# Patient Record
Sex: FEMALE | Race: WHITE | NOT HISPANIC OR LATINO
[De-identification: names, ages, dates, MRNs, and addresses within clinical notes are randomized per-mention and may not be internally consistent; named-entity substitution may affect disease eponyms.]

---

## 2019-03-06 PROBLEM — Z00.00 ENCOUNTER FOR PREVENTIVE HEALTH EXAMINATION: Status: ACTIVE | Noted: 2019-03-06

## 2019-03-15 ENCOUNTER — APPOINTMENT (OUTPATIENT)
Dept: BARIATRICS | Facility: CLINIC | Age: 49
End: 2019-03-15
Payer: COMMERCIAL

## 2019-03-15 VITALS
BODY MASS INDEX: 42.33 KG/M2 | HEIGHT: 62 IN | WEIGHT: 230 LBS | SYSTOLIC BLOOD PRESSURE: 118 MMHG | DIASTOLIC BLOOD PRESSURE: 73 MMHG | HEART RATE: 74 BPM

## 2019-03-15 DIAGNOSIS — Z87.891 PERSONAL HISTORY OF NICOTINE DEPENDENCE: ICD-10-CM

## 2019-03-15 DIAGNOSIS — Z80.9 FAMILY HISTORY OF MALIGNANT NEOPLASM, UNSPECIFIED: ICD-10-CM

## 2019-03-15 DIAGNOSIS — F32.9 MAJOR DEPRESSIVE DISORDER, SINGLE EPISODE, UNSPECIFIED: ICD-10-CM

## 2019-03-15 DIAGNOSIS — Z87.09 PERSONAL HISTORY OF OTHER DISEASES OF THE RESPIRATORY SYSTEM: ICD-10-CM

## 2019-03-15 PROCEDURE — 99202 OFFICE O/P NEW SF 15 MIN: CPT

## 2019-03-15 NOTE — HISTORY OF PRESENT ILLNESS
[de-identified] : 48 year old female with refractive morbid obesity who is followed by Dr. Ramires. Has been on countless diets without success. 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 Additionally, shee has HLD on her blood work referred by Dr. Ramires for bariatric surgical consult and as explained to pt it is not likely any other treatment will be successful but also not at risk for severe complication from metabolic disease at present. Pt wants to go forward with VSG and think this is reasonable and nothing further to be gained by insurance mandated diet in motivated pt that has been followed by her physician.

## 2019-03-22 ENCOUNTER — APPOINTMENT (OUTPATIENT)
Dept: BARIATRICS | Facility: CLINIC | Age: 49
End: 2019-03-22
Payer: COMMERCIAL

## 2019-03-22 PROCEDURE — 90791 PSYCH DIAGNOSTIC EVALUATION: CPT

## 2019-03-27 ENCOUNTER — APPOINTMENT (OUTPATIENT)
Dept: BARIATRICS | Facility: CLINIC | Age: 49
End: 2019-03-27
Payer: COMMERCIAL

## 2019-03-27 PROCEDURE — 97802 MEDICAL NUTRITION INDIV IN: CPT

## 2019-04-15 ENCOUNTER — APPOINTMENT (OUTPATIENT)
Dept: BARIATRICS | Facility: CLINIC | Age: 49
End: 2019-04-15
Payer: COMMERCIAL

## 2019-04-15 VITALS
WEIGHT: 227.2 LBS | BODY MASS INDEX: 41.81 KG/M2 | SYSTOLIC BLOOD PRESSURE: 127 MMHG | HEIGHT: 62 IN | HEART RATE: 66 BPM | DIASTOLIC BLOOD PRESSURE: 82 MMHG

## 2019-04-15 PROCEDURE — 99213 OFFICE O/P EST LOW 20 MIN: CPT

## 2019-04-25 ENCOUNTER — APPOINTMENT (OUTPATIENT)
Dept: BARIATRICS | Facility: HOSPITAL | Age: 49
End: 2019-04-25

## 2019-04-29 ENCOUNTER — CLINICAL ADVICE (OUTPATIENT)
Age: 49
End: 2019-04-29

## 2019-05-06 ENCOUNTER — APPOINTMENT (OUTPATIENT)
Dept: BARIATRICS | Facility: CLINIC | Age: 49
End: 2019-05-06
Payer: COMMERCIAL

## 2019-05-06 VITALS
SYSTOLIC BLOOD PRESSURE: 115 MMHG | BODY MASS INDEX: 39.4 KG/M2 | HEART RATE: 71 BPM | DIASTOLIC BLOOD PRESSURE: 71 MMHG | WEIGHT: 215.4 LBS

## 2019-05-06 PROCEDURE — 97803 MED NUTRITION INDIV SUBSEQ: CPT

## 2019-05-06 PROCEDURE — 99024 POSTOP FOLLOW-UP VISIT: CPT

## 2019-06-27 ENCOUNTER — APPOINTMENT (OUTPATIENT)
Dept: BARIATRICS | Facility: CLINIC | Age: 49
End: 2019-06-27

## 2019-06-27 ENCOUNTER — APPOINTMENT (OUTPATIENT)
Dept: BARIATRICS | Facility: CLINIC | Age: 49
End: 2019-06-27
Payer: COMMERCIAL

## 2019-06-27 VITALS
SYSTOLIC BLOOD PRESSURE: 114 MMHG | DIASTOLIC BLOOD PRESSURE: 76 MMHG | HEART RATE: 76 BPM | BODY MASS INDEX: 35.15 KG/M2 | WEIGHT: 192.2 LBS

## 2019-06-27 PROCEDURE — 99024 POSTOP FOLLOW-UP VISIT: CPT

## 2019-09-19 ENCOUNTER — APPOINTMENT (OUTPATIENT)
Dept: BARIATRICS | Facility: CLINIC | Age: 49
End: 2019-09-19
Payer: COMMERCIAL

## 2019-09-19 ENCOUNTER — APPOINTMENT (OUTPATIENT)
Dept: BARIATRICS | Facility: CLINIC | Age: 49
End: 2019-09-19

## 2019-09-19 VITALS
SYSTOLIC BLOOD PRESSURE: 118 MMHG | DIASTOLIC BLOOD PRESSURE: 77 MMHG | WEIGHT: 167 LBS | HEART RATE: 67 BPM | BODY MASS INDEX: 30.73 KG/M2 | HEIGHT: 62 IN

## 2019-09-19 PROCEDURE — 99213 OFFICE O/P EST LOW 20 MIN: CPT

## 2019-09-19 PROCEDURE — 97803 MED NUTRITION INDIV SUBSEQ: CPT

## 2019-12-19 ENCOUNTER — APPOINTMENT (OUTPATIENT)
Dept: BARIATRICS | Facility: CLINIC | Age: 49
End: 2019-12-19
Payer: COMMERCIAL

## 2019-12-19 VITALS
WEIGHT: 157 LBS | SYSTOLIC BLOOD PRESSURE: 136 MMHG | HEIGHT: 62 IN | BODY MASS INDEX: 28.89 KG/M2 | HEART RATE: 69 BPM | DIASTOLIC BLOOD PRESSURE: 89 MMHG

## 2019-12-19 PROCEDURE — 99213 OFFICE O/P EST LOW 20 MIN: CPT

## 2020-04-30 ENCOUNTER — APPOINTMENT (OUTPATIENT)
Dept: BARIATRICS | Facility: CLINIC | Age: 50
End: 2020-04-30
Payer: COMMERCIAL

## 2020-04-30 VITALS — BODY MASS INDEX: 27.25 KG/M2 | WEIGHT: 149 LBS

## 2020-04-30 PROCEDURE — 99213 OFFICE O/P EST LOW 20 MIN: CPT | Mod: 95

## 2020-05-14 ENCOUNTER — TRANSCRIPTION ENCOUNTER (OUTPATIENT)
Age: 50
End: 2020-05-14

## 2020-09-15 DIAGNOSIS — E46 UNSPECIFIED PROTEIN-CALORIE MALNUTRITION: ICD-10-CM

## 2020-09-25 ENCOUNTER — APPOINTMENT (OUTPATIENT)
Dept: BARIATRICS | Facility: CLINIC | Age: 50
End: 2020-09-25
Payer: COMMERCIAL

## 2020-09-25 PROCEDURE — 97803 MED NUTRITION INDIV SUBSEQ: CPT | Mod: 95

## 2020-10-08 ENCOUNTER — APPOINTMENT (OUTPATIENT)
Dept: BARIATRICS | Facility: CLINIC | Age: 50
End: 2020-10-08
Payer: COMMERCIAL

## 2020-10-08 VITALS — WEIGHT: 152 LBS | BODY MASS INDEX: 27.97 KG/M2 | HEIGHT: 62 IN

## 2020-10-08 DIAGNOSIS — E66.01 MORBID (SEVERE) OBESITY DUE TO EXCESS CALORIES: ICD-10-CM

## 2020-10-08 PROCEDURE — 99213 OFFICE O/P EST LOW 20 MIN: CPT | Mod: 95

## 2020-10-08 NOTE — ASSESSMENT
[FreeTextEntry1] : Hortensia is doing well 1.5 years s/p surgery. Importance of structure stressed in order to maintain weight loss. All questions asked and answered. No complaints.

## 2020-10-08 NOTE — HISTORY OF PRESENT ILLNESS
[Home] : at home, [unfilled] , at the time of the visit. [Medical Office: ([facility])___] : at the medical office located in  [Other:____] : [unfilled] [Verbal consent obtained from patient] : the patient, [unfilled] [de-identified] : Hortensia Villatoro returns to see us today. She is s/p sleeve gastrectomy 18 months ago. 80 pound weight loss, no complaints, she is very very happy. Importance of structure explained. Will follow up with us in 6 months.

## 2020-11-12 ENCOUNTER — APPOINTMENT (OUTPATIENT)
Dept: PLASTIC SURGERY | Facility: CLINIC | Age: 50
End: 2020-11-12
Payer: COMMERCIAL

## 2020-11-12 VITALS
TEMPERATURE: 97.4 F | HEIGHT: 63 IN | BODY MASS INDEX: 27.11 KG/M2 | HEART RATE: 64 BPM | SYSTOLIC BLOOD PRESSURE: 125 MMHG | DIASTOLIC BLOOD PRESSURE: 82 MMHG | RESPIRATION RATE: 20 BRPM | OXYGEN SATURATION: 99 % | WEIGHT: 153 LBS

## 2020-11-12 DIAGNOSIS — L98.7 EXCESSIVE AND REDUNDANT SKIN AND SUBCUTANEOUS TISSUE: ICD-10-CM

## 2020-11-12 DIAGNOSIS — Z86.59 PERSONAL HISTORY OF OTHER MENTAL AND BEHAVIORAL DISORDERS: ICD-10-CM

## 2020-11-12 PROCEDURE — 99204 OFFICE O/P NEW MOD 45 MIN: CPT

## 2020-11-12 PROCEDURE — 99072 ADDL SUPL MATRL&STAF TM PHE: CPT

## 2020-11-12 RX ORDER — CHOLECALCIFEROL (VITAMIN D3) 50 MCG
2000 CAPSULE ORAL
Refills: 0 | Status: ACTIVE | COMMUNITY

## 2020-11-12 RX ORDER — IBUPROFEN 200 MG/1
200 TABLET, COATED ORAL
Refills: 0 | Status: ACTIVE | COMMUNITY

## 2020-11-12 RX ORDER — MULTIVITAMIN
TABLET ORAL
Refills: 0 | Status: ACTIVE | COMMUNITY

## 2020-11-12 RX ORDER — BUPROPION HYDROCHLORIDE 100 MG/1
100 TABLET, FILM COATED ORAL
Refills: 0 | Status: ACTIVE | COMMUNITY

## 2020-11-12 RX ORDER — UBIDECARENONE 200 MG
500 CAPSULE ORAL
Refills: 0 | Status: ACTIVE | COMMUNITY

## 2020-11-12 RX ORDER — SERTRALINE HYDROCHLORIDE 100 MG/1
100 TABLET, FILM COATED ORAL
Refills: 0 | Status: ACTIVE | COMMUNITY

## 2020-11-12 NOTE — ASSESSMENT
[FreeTextEntry1] : Ms. HEIDY WATSON is a 50 year White woman who has consulted with me regarding panniculectomy via insurance and liposuction of her trunk cosmetic in nature for improving the contour of her  her abdomen and eliminating the overhanging pannus which causes rashes . HEIDY  is a candidate for panniculectomy and liposuction.  The permanent scar along the lower abdomen from hip to hip and around the umbilicus, was demonstrated and explained.   There is no hernia present and liposuction will be required along the lateral hip back and abdominal  areas to further improve the contour.  Drains will be used and then removed in one week.  The risks, benefits, alternatives, limitations, and the permanent scars were outlined with the patient and She will consider scheduling surgery under general anesthesia at a convenient time.  All questions were answered. pt is to be sure to eat well and take vitamins leading up to surgery \par

## 2020-11-12 NOTE — HISTORY OF PRESENT ILLNESS
[FreeTextEntry1] : Patient is status post gastric sleeve with weight loss of 80 lbs.  She desires combination panniculectomy and liposuction of the abdomen hips and back.  The risks, benefits, alternatives, limitations and the permanent scars were outlined with the patient. pt is prepared for general anesthetic,  removal of the pannus surgically and liposuction of the abdomen hips and back.  limitations of surgery stressed.  pt with h/o c section previously no hernia.  mis 2

## 2020-11-12 NOTE — REVIEW OF SYSTEMS
[Recent Weight Loss (___ Lbs)] : recent [unfilled] ~Ulb weight loss [Itching] : itching [Anxiety] : anxiety [Depression] : depression [Negative] : Heme/Lymph [de-identified] : rash intertrigo

## 2020-11-12 NOTE — PHYSICAL EXAM
[NI] : Normal [de-identified] : sn n 32 cm  l larger than r 30 gm nls sens no mass  [de-identified] : c section scar and lap scars well healed no hernia upper and lower pannus  lipodystrophy 3 inch upper abdomen